# Patient Record
Sex: FEMALE | Race: WHITE
[De-identification: names, ages, dates, MRNs, and addresses within clinical notes are randomized per-mention and may not be internally consistent; named-entity substitution may affect disease eponyms.]

---

## 2019-08-05 ENCOUNTER — HOSPITAL ENCOUNTER (EMERGENCY)
Dept: HOSPITAL 4 - SED | Age: 24
Discharge: HOME | End: 2019-08-05
Payer: COMMERCIAL

## 2019-08-05 VITALS — BODY MASS INDEX: 29.07 KG/M2 | HEIGHT: 61 IN | WEIGHT: 154 LBS

## 2019-08-05 VITALS — SYSTOLIC BLOOD PRESSURE: 133 MMHG

## 2019-08-05 VITALS — SYSTOLIC BLOOD PRESSURE: 146 MMHG

## 2019-08-05 DIAGNOSIS — Z71.6: ICD-10-CM

## 2019-08-05 DIAGNOSIS — F17.210: ICD-10-CM

## 2019-08-05 DIAGNOSIS — F32.9: ICD-10-CM

## 2019-08-05 DIAGNOSIS — Z79.899: ICD-10-CM

## 2019-08-05 DIAGNOSIS — R03.0: ICD-10-CM

## 2019-08-05 DIAGNOSIS — F41.9: ICD-10-CM

## 2019-08-05 DIAGNOSIS — G25.2: ICD-10-CM
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
## 2019-08-05 DIAGNOSIS — T43.211A: Primary | ICD-10-CM

## 2019-08-05 DIAGNOSIS — F12.10: ICD-10-CM

## 2019-08-05 LAB
AMPHETAMINES UR QL SCN: NEGATIVE
BARBITURATES UR QL SCN: NEGATIVE
BENZODIAZ UR QL SCN: NEGATIVE
BZE UR QL SCN: NEGATIVE
CANNABINOIDS UR QL SCN: POSITIVE
HCG UR QL: NEGATIVE
METHADONE UR-SCNC: NEGATIVE UMOL/L
METHAMPHET UR-SCNC: NEGATIVE UMOL/L
OPIATES UR QL SCN: NEGATIVE
OXYCODONE SERPL-MCNC: NEGATIVE NG/ML
PCP UR QL SCN: NEGATIVE
TRICYCLICS UR-MCNC: NEGATIVE NG/ML
URINE PROPOXYPHENE SCREEN: NEGATIVE

## 2019-08-05 NOTE — NUR
Patient given written and verbal discharge instructions and verbalizes 
understanding.  ER NP Orin discussed with patient the results and treatment 
provided. Patient in stable condition. ID arm band removed. Rx of Ativan given. 
Patient educated on pain management and to follow up with PMD. Pain Scale 0. 
Opportunity for questions provided and answered. Medication side effect fact 
sheet provided.

## 2019-08-05 NOTE — NUR
Called Poison Control at [phone] and spoke with Konrad. Per 
recommendations: Patient on medication for 3 years  per Konrad harmon to  monitor 
at home.  Patient may be shaky or sleepy.  ADAN Aamdo notified. Will continue to 
monitor patient.

## 2019-08-05 NOTE — NUR
Patient to ER [place] [facility] to [facility] for evaluation. Side rails up. Report given to 
Ni LOMELI.

## 2019-08-05 NOTE — NUR
Patient reports taking Effexor  mg today which is twice her daily 
prescription dosage accidentally.  Patient reports shakiness.  Denies any HI/ 
SI.  No other voiced complaints or injuries per patient or as noted. Will 
continue to monitor.